# Patient Record
Sex: MALE | Race: WHITE | HISPANIC OR LATINO | Employment: FULL TIME | URBAN - METROPOLITAN AREA
[De-identification: names, ages, dates, MRNs, and addresses within clinical notes are randomized per-mention and may not be internally consistent; named-entity substitution may affect disease eponyms.]

---

## 2022-10-26 ENCOUNTER — CLINICAL SUPPORT (OUTPATIENT)
Dept: URGENT CARE | Facility: CLINIC | Age: 27
End: 2022-10-26

## 2022-10-26 VITALS
HEART RATE: 67 BPM | BODY MASS INDEX: 21.97 KG/M2 | OXYGEN SATURATION: 98 % | RESPIRATION RATE: 16 BRPM | HEIGHT: 67 IN | WEIGHT: 140 LBS | DIASTOLIC BLOOD PRESSURE: 78 MMHG | TEMPERATURE: 99 F | SYSTOLIC BLOOD PRESSURE: 135 MMHG

## 2022-10-26 NOTE — PROGRESS NOTES
"Subjective:       Patient ID: Jones Reddy is a 27 y.o. male.    Vitals:  height is 5' 7" (1.702 m) and weight is 63.5 kg (140 lb). His respiration is 16 and oxygen saturation is 98%.     Chief Complaint: No chief complaint on file.    Patient reports he burnt his right index finger a week ago and it is starting to get infected.     Other  This is a new problem. The current episode started in the past 7 days. Treatments tried: ice. The treatment provided no relief.   ROS    Objective:      Physical Exam      Assessment:       No diagnosis found.      Plan:         There are no diagnoses linked to this encounter.                 "